# Patient Record
Sex: FEMALE | URBAN - METROPOLITAN AREA
[De-identification: names, ages, dates, MRNs, and addresses within clinical notes are randomized per-mention and may not be internally consistent; named-entity substitution may affect disease eponyms.]

---

## 2021-11-03 ENCOUNTER — OFFICE VISIT (OUTPATIENT)
Dept: URBAN - METROPOLITAN AREA CLINIC 81 | Facility: CLINIC | Age: 36
End: 2021-11-03

## 2021-11-03 DIAGNOSIS — G43.809 OTHER MIGRAINE, NOT INTRACTABLE, WITHOUT STATUS MIGRAINOSUS: Primary | ICD-10-CM

## 2021-11-03 DIAGNOSIS — H17.89 OTHER CORNEAL SCARS AND OPACITIES: ICD-10-CM

## 2021-11-03 DIAGNOSIS — H04.123 DRY EYE SYNDROME OF BILATERAL LACRIMAL GLANDS: ICD-10-CM

## 2021-11-03 DIAGNOSIS — Z83.511 FAMILY HISTORY OF GLAUCOMA: ICD-10-CM

## 2021-11-03 PROCEDURE — 99203 OFFICE O/P NEW LOW 30 MIN: CPT | Performed by: OPTOMETRIST

## 2021-11-03 ASSESSMENT — INTRAOCULAR PRESSURE
OD: 15
OS: 15

## 2021-11-03 NOTE — IMPRESSION/PLAN
Impression: Family history of glaucoma: Z83.511. Plan: Large C/D OU, IOP normotensive OU. Monitor with yearly dilated eye exams.

## 2021-11-03 NOTE — IMPRESSION/PLAN
Impression: Other migraine, not intractable, without status migrainosus: G43.809. -ocular migraine without headache
-first episode today, when at computer, lasted about 1 minute, right sided
-dilated exam today, no retina findings Plan: Discussed diagnosis with patient in detail. Advised to f/up with PCP if symptoms worsen and are recurrent. Patient instructed to call if condition gets worse.

## 2021-11-03 NOTE — IMPRESSION/PLAN
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

-s/p LASIK OU Plan: Stable, observe.